# Patient Record
Sex: FEMALE | Race: WHITE | Employment: UNEMPLOYED | URBAN - METROPOLITAN AREA
[De-identification: names, ages, dates, MRNs, and addresses within clinical notes are randomized per-mention and may not be internally consistent; named-entity substitution may affect disease eponyms.]

---

## 2016-07-08 LAB
ANTIBODY SCREEN, EXTERNAL: NEGATIVE
CHLAMYDIA, EXTERNAL: NEGATIVE
HBSAG, EXTERNAL: NEGATIVE
HCT, EXTERNAL: 36.6
HGB, EXTERNAL: 12
HIV, EXTERNAL: NEGATIVE
N. GONORRHEA, EXTERNAL: NEGATIVE
RPR, EXTERNAL: NEGATIVE
RUBELLA, EXTERNAL: NORMAL
URINALYSIS, EXTERNAL: NEGATIVE

## 2017-01-04 LAB — GRBS, EXTERNAL: NEGATIVE

## 2017-02-07 ENCOUNTER — ANESTHESIA EVENT (OUTPATIENT)
Dept: LABOR AND DELIVERY | Age: 37
End: 2017-02-07
Payer: OTHER GOVERNMENT

## 2017-02-07 ENCOUNTER — ANESTHESIA (OUTPATIENT)
Dept: LABOR AND DELIVERY | Age: 37
End: 2017-02-07
Payer: OTHER GOVERNMENT

## 2017-02-07 ENCOUNTER — HOSPITAL ENCOUNTER (INPATIENT)
Age: 37
LOS: 1 days | Discharge: HOME | End: 2017-02-08
Attending: OBSTETRICS & GYNECOLOGY | Admitting: OBSTETRICS & GYNECOLOGY
Payer: OTHER GOVERNMENT

## 2017-02-07 PROBLEM — Z67.91 RH NEGATIVE STATE IN ANTEPARTUM PERIOD: Status: ACTIVE | Noted: 2017-02-07

## 2017-02-07 PROBLEM — O26.899 RH NEGATIVE STATE IN ANTEPARTUM PERIOD: Status: ACTIVE | Noted: 2017-02-07

## 2017-02-07 PROBLEM — O09.529 AMA (ADVANCED MATERNAL AGE) MULTIGRAVIDA 35+: Status: ACTIVE | Noted: 2017-02-07

## 2017-02-07 LAB
ABO + RH BLD: NORMAL
BASOPHILS # BLD AUTO: 0 K/UL (ref 0–0.06)
BASOPHILS # BLD: 0 % (ref 0–2)
BLOOD BANK CMNT PATIENT-IMP: NORMAL
BLOOD GROUP ANTIBODIES SERPL: NORMAL
BLOOD GROUP ANTIBODIES SERPL: NORMAL
DIFFERENTIAL METHOD BLD: ABNORMAL
EOSINOPHIL # BLD: 0 K/UL (ref 0–0.4)
EOSINOPHIL NFR BLD: 0 % (ref 0–5)
ERYTHROCYTE [DISTWIDTH] IN BLOOD BY AUTOMATED COUNT: 13.7 % (ref 11.6–14.5)
HCT VFR BLD AUTO: 38 % (ref 35–45)
HGB BLD-MCNC: 12.9 G/DL (ref 12–16)
LYMPHOCYTES # BLD AUTO: 8 % (ref 21–52)
LYMPHOCYTES # BLD: 1.2 K/UL (ref 0.9–3.6)
MCH RBC QN AUTO: 31 PG (ref 24–34)
MCHC RBC AUTO-ENTMCNC: 33.9 G/DL (ref 31–37)
MCV RBC AUTO: 91.3 FL (ref 74–97)
MONOCYTES # BLD: 0.6 K/UL (ref 0.05–1.2)
MONOCYTES NFR BLD AUTO: 4 % (ref 3–10)
NEUTS SEG # BLD: 12.6 K/UL (ref 1.8–8)
NEUTS SEG NFR BLD AUTO: 88 % (ref 40–73)
PLATELET # BLD AUTO: 136 K/UL (ref 135–420)
PMV BLD AUTO: 10.6 FL (ref 9.2–11.8)
RBC # BLD AUTO: 4.16 M/UL (ref 4.2–5.3)
SPECIMEN EXP DATE BLD: NORMAL
WBC # BLD AUTO: 14.4 K/UL (ref 4.6–13.2)

## 2017-02-07 PROCEDURE — 77010026065 HC OXYGEN MINIMUM MEDICAL AIR

## 2017-02-07 PROCEDURE — 00HU33Z INSERTION OF INFUSION DEVICE INTO SPINAL CANAL, PERCUTANEOUS APPROACH: ICD-10-PCS | Performed by: ANESTHESIOLOGY

## 2017-02-07 PROCEDURE — 75410000000 HC DELIVERY VAGINAL/SINGLE

## 2017-02-07 PROCEDURE — 75410000003 HC RECOV DEL/VAG/CSECN EA 0.5 HR

## 2017-02-07 PROCEDURE — 74011250637 HC RX REV CODE- 250/637: Performed by: ADVANCED PRACTICE MIDWIFE

## 2017-02-07 PROCEDURE — 74011250636 HC RX REV CODE- 250/636

## 2017-02-07 PROCEDURE — 74011250637 HC RX REV CODE- 250/637

## 2017-02-07 PROCEDURE — 75410000002 HC LABOR FEE PER 1 HR

## 2017-02-07 PROCEDURE — 65270000029 HC RM PRIVATE

## 2017-02-07 PROCEDURE — 74011000250 HC RX REV CODE- 250

## 2017-02-07 PROCEDURE — 76060000078 HC EPIDURAL ANESTHESIA

## 2017-02-07 PROCEDURE — 74011250636 HC RX REV CODE- 250/636: Performed by: ADVANCED PRACTICE MIDWIFE

## 2017-02-07 PROCEDURE — 77030020255 HC SOL INJ LR 1000ML BG

## 2017-02-07 PROCEDURE — 85025 COMPLETE CBC W/AUTO DIFF WBC: CPT | Performed by: ADVANCED PRACTICE MIDWIFE

## 2017-02-07 PROCEDURE — 86900 BLOOD TYPING SEROLOGIC ABO: CPT | Performed by: ADVANCED PRACTICE MIDWIFE

## 2017-02-07 PROCEDURE — 74011250636 HC RX REV CODE- 250/636: Performed by: ANESTHESIOLOGY

## 2017-02-07 PROCEDURE — 77030034849

## 2017-02-07 PROCEDURE — 86870 RBC ANTIBODY IDENTIFICATION: CPT | Performed by: ADVANCED PRACTICE MIDWIFE

## 2017-02-07 RX ORDER — PROMETHAZINE HYDROCHLORIDE 25 MG/ML
25 INJECTION, SOLUTION INTRAMUSCULAR; INTRAVENOUS
Status: DISCONTINUED | OUTPATIENT
Start: 2017-02-07 | End: 2017-02-08 | Stop reason: HOSPADM

## 2017-02-07 RX ORDER — CARBOPROST TROMETHAMINE 250 UG/ML
250 INJECTION, SOLUTION INTRAMUSCULAR
Status: DISCONTINUED | OUTPATIENT
Start: 2017-02-07 | End: 2017-02-07 | Stop reason: HOSPADM

## 2017-02-07 RX ORDER — OXYCODONE AND ACETAMINOPHEN 5; 325 MG/1; MG/1
1-2 TABLET ORAL
Status: DISCONTINUED | OUTPATIENT
Start: 2017-02-07 | End: 2017-02-08 | Stop reason: HOSPADM

## 2017-02-07 RX ORDER — TERBUTALINE SULFATE 1 MG/ML
0.25 INJECTION SUBCUTANEOUS
Status: DISCONTINUED | OUTPATIENT
Start: 2017-02-07 | End: 2017-02-07 | Stop reason: HOSPADM

## 2017-02-07 RX ORDER — AMOXICILLIN 250 MG
1 CAPSULE ORAL 2 TIMES DAILY
Status: DISCONTINUED | OUTPATIENT
Start: 2017-02-07 | End: 2017-02-08 | Stop reason: HOSPADM

## 2017-02-07 RX ORDER — MAG HYDROX/ALUMINUM HYD/SIMETH 200-200-20
15 SUSPENSION, ORAL (FINAL DOSE FORM) ORAL
Status: DISCONTINUED | OUTPATIENT
Start: 2017-02-07 | End: 2017-02-07 | Stop reason: HOSPADM

## 2017-02-07 RX ORDER — OXYTOCIN IN 5 % DEXTROSE 30/500 ML
.5-2 PLASTIC BAG, INJECTION (ML) INTRAVENOUS
Status: DISCONTINUED | OUTPATIENT
Start: 2017-02-07 | End: 2017-02-07

## 2017-02-07 RX ORDER — BUTORPHANOL TARTRATE 1 MG/ML
1-2 INJECTION INTRAMUSCULAR; INTRAVENOUS
Status: DISCONTINUED | OUTPATIENT
Start: 2017-02-07 | End: 2017-02-07 | Stop reason: HOSPADM

## 2017-02-07 RX ORDER — LIDOCAINE HYDROCHLORIDE AND EPINEPHRINE 15; 5 MG/ML; UG/ML
INJECTION, SOLUTION EPIDURAL AS NEEDED
Status: DISCONTINUED | OUTPATIENT
Start: 2017-02-07 | End: 2017-02-07 | Stop reason: HOSPADM

## 2017-02-07 RX ORDER — FENTANYL CITRATE 50 UG/ML
INJECTION, SOLUTION INTRAMUSCULAR; INTRAVENOUS
Status: COMPLETED
Start: 2017-02-07 | End: 2017-02-07

## 2017-02-07 RX ORDER — NALOXONE HYDROCHLORIDE 0.4 MG/ML
0.2 INJECTION, SOLUTION INTRAMUSCULAR; INTRAVENOUS; SUBCUTANEOUS AS NEEDED
Status: DISCONTINUED | OUTPATIENT
Start: 2017-02-07 | End: 2017-02-07 | Stop reason: HOSPADM

## 2017-02-07 RX ORDER — NALBUPHINE HYDROCHLORIDE 10 MG/ML
10 INJECTION, SOLUTION INTRAMUSCULAR; INTRAVENOUS; SUBCUTANEOUS
Status: DISCONTINUED | OUTPATIENT
Start: 2017-02-07 | End: 2017-02-07 | Stop reason: HOSPADM

## 2017-02-07 RX ORDER — HYDROMORPHONE HYDROCHLORIDE 1 MG/ML
1 INJECTION, SOLUTION INTRAMUSCULAR; INTRAVENOUS; SUBCUTANEOUS
Status: DISCONTINUED | OUTPATIENT
Start: 2017-02-07 | End: 2017-02-07 | Stop reason: HOSPADM

## 2017-02-07 RX ORDER — METHYLERGONOVINE MALEATE 0.2 MG/ML
0.2 INJECTION INTRAVENOUS AS NEEDED
Status: DISCONTINUED | OUTPATIENT
Start: 2017-02-07 | End: 2017-02-07 | Stop reason: HOSPADM

## 2017-02-07 RX ORDER — SODIUM CHLORIDE 0.9 % (FLUSH) 0.9 %
5-10 SYRINGE (ML) INJECTION EVERY 8 HOURS
Status: DISCONTINUED | OUTPATIENT
Start: 2017-02-07 | End: 2017-02-07 | Stop reason: HOSPADM

## 2017-02-07 RX ORDER — SODIUM CHLORIDE, SODIUM LACTATE, POTASSIUM CHLORIDE, CALCIUM CHLORIDE 600; 310; 30; 20 MG/100ML; MG/100ML; MG/100ML; MG/100ML
125 INJECTION, SOLUTION INTRAVENOUS CONTINUOUS
Status: DISCONTINUED | OUTPATIENT
Start: 2017-02-07 | End: 2017-02-07 | Stop reason: HOSPADM

## 2017-02-07 RX ORDER — ONDANSETRON 2 MG/ML
4 INJECTION INTRAMUSCULAR; INTRAVENOUS
Status: DISCONTINUED | OUTPATIENT
Start: 2017-02-07 | End: 2017-02-07 | Stop reason: HOSPADM

## 2017-02-07 RX ORDER — MISOPROSTOL 200 UG/1
800 TABLET ORAL
Status: DISCONTINUED | OUTPATIENT
Start: 2017-02-07 | End: 2017-02-07 | Stop reason: HOSPADM

## 2017-02-07 RX ORDER — CASTOR OIL 100 %
OIL (ML) ORAL
Status: COMPLETED
Start: 2017-02-07 | End: 2017-02-07

## 2017-02-07 RX ORDER — LIDOCAINE HYDROCHLORIDE 10 MG/ML
30 INJECTION, SOLUTION EPIDURAL; INFILTRATION; INTRACAUDAL; PERINEURAL AS NEEDED
Status: DISCONTINUED | OUTPATIENT
Start: 2017-02-07 | End: 2017-02-07 | Stop reason: HOSPADM

## 2017-02-07 RX ORDER — OXYTOCIN/RINGER'S LACTATE 20/1000 ML
500 PLASTIC BAG, INJECTION (ML) INTRAVENOUS ONCE
Status: COMPLETED | OUTPATIENT
Start: 2017-02-07 | End: 2017-02-07

## 2017-02-07 RX ORDER — FENTANYL CITRATE 50 UG/ML
100 INJECTION, SOLUTION INTRAMUSCULAR; INTRAVENOUS ONCE
Status: COMPLETED | OUTPATIENT
Start: 2017-02-07 | End: 2017-02-07

## 2017-02-07 RX ORDER — SODIUM CHLORIDE 0.9 % (FLUSH) 0.9 %
5-10 SYRINGE (ML) INJECTION AS NEEDED
Status: DISCONTINUED | OUTPATIENT
Start: 2017-02-07 | End: 2017-02-07 | Stop reason: HOSPADM

## 2017-02-07 RX ORDER — DIPHENHYDRAMINE HYDROCHLORIDE 50 MG/ML
25 INJECTION, SOLUTION INTRAMUSCULAR; INTRAVENOUS
Status: DISCONTINUED | OUTPATIENT
Start: 2017-02-07 | End: 2017-02-07 | Stop reason: HOSPADM

## 2017-02-07 RX ORDER — IBUPROFEN 400 MG/1
800 TABLET ORAL
Status: DISCONTINUED | OUTPATIENT
Start: 2017-02-07 | End: 2017-02-08 | Stop reason: HOSPADM

## 2017-02-07 RX ORDER — TRISODIUM CITRATE DIHYDRATE AND CITRIC ACID MONOHYDRATE 500; 334 MG/5ML; MG/5ML
30 SOLUTION ORAL AS NEEDED
Status: DISCONTINUED | OUTPATIENT
Start: 2017-02-07 | End: 2017-02-07 | Stop reason: HOSPADM

## 2017-02-07 RX ORDER — ACETAMINOPHEN 325 MG/1
650 TABLET ORAL
Status: DISCONTINUED | OUTPATIENT
Start: 2017-02-07 | End: 2017-02-08 | Stop reason: HOSPADM

## 2017-02-07 RX ORDER — ROPIVACAINE HYDROCHLORIDE 2 MG/ML
INJECTION, SOLUTION EPIDURAL; INFILTRATION; PERINEURAL AS NEEDED
Status: DISCONTINUED | OUTPATIENT
Start: 2017-02-07 | End: 2017-02-07 | Stop reason: HOSPADM

## 2017-02-07 RX ORDER — OXYTOCIN/RINGER'S LACTATE 20/1000 ML
125 PLASTIC BAG, INJECTION (ML) INTRAVENOUS CONTINUOUS
Status: DISCONTINUED | OUTPATIENT
Start: 2017-02-07 | End: 2017-02-07 | Stop reason: HOSPADM

## 2017-02-07 RX ADMIN — LIDOCAINE HYDROCHLORIDE AND EPINEPHRINE 3 ML: 15; 5 INJECTION, SOLUTION EPIDURAL at 09:31

## 2017-02-07 RX ADMIN — LIDOCAINE HYDROCHLORIDE AND EPINEPHRINE 2 ML: 15; 5 INJECTION, SOLUTION EPIDURAL at 09:32

## 2017-02-07 RX ADMIN — FENTANYL CITRATE 100 MCG: 50 INJECTION, SOLUTION INTRAMUSCULAR; INTRAVENOUS at 09:35

## 2017-02-07 RX ADMIN — Medication 10000 MILLI-UNITS/HR: at 13:20

## 2017-02-07 RX ADMIN — IBUPROFEN 800 MG: 400 TABLET, FILM COATED ORAL at 22:27

## 2017-02-07 RX ADMIN — Medication 10 ML/HR: at 09:36

## 2017-02-07 RX ADMIN — ROPIVACAINE HYDROCHLORIDE 5 ML: 2 INJECTION, SOLUTION EPIDURAL; INFILTRATION; PERINEURAL at 09:38

## 2017-02-07 RX ADMIN — SODIUM CHLORIDE, SODIUM LACTATE, POTASSIUM CHLORIDE, AND CALCIUM CHLORIDE 1000 ML: 600; 310; 30; 20 INJECTION, SOLUTION INTRAVENOUS at 10:00

## 2017-02-07 RX ADMIN — CASTOR OIL 59 ML: 100 LIQUID ORAL at 13:17

## 2017-02-07 RX ADMIN — Medication 2 MILLI-UNITS/MIN: at 12:14

## 2017-02-07 RX ADMIN — SODIUM CHLORIDE, SODIUM LACTATE, POTASSIUM CHLORIDE, AND CALCIUM CHLORIDE 125 ML/HR: 600; 310; 30; 20 INJECTION, SOLUTION INTRAVENOUS at 08:00

## 2017-02-07 NOTE — PROGRESS NOTES
Adelaide Worthington began pushing spontaneously. Large lip present, but soft and easy to raise.  Adelaide Worthington made several attempts to push under the lip, but it returns full-on and she is struggling to move this baby from 0 station as her desire was to have epidural. Will contact anesthesia to administer, allow labor down and anticipate vaginal birth

## 2017-02-07 NOTE — ANESTHESIA PREPROCEDURE EVALUATION
Anesthetic History   No history of anesthetic complications            Review of Systems / Medical History  Patient summary reviewed, nursing notes reviewed and pertinent labs reviewed    Pulmonary  Within defined limits                 Neuro/Psych   Within defined limits           Cardiovascular  Within defined limits                Exercise tolerance: >4 METS     GI/Hepatic/Renal  Within defined limits              Endo/Other  Within defined limits           Other Findings              Physical Exam    Airway  Mallampati: II  TM Distance: 4 - 6 cm    Mouth opening: Normal     Cardiovascular  Regular rate and rhythm,  S1 and S2 normal,  no murmur, click, rub, or gallop  Rhythm: regular  Rate: normal         Dental  No notable dental hx       Pulmonary  Breath sounds clear to auscultation               Abdominal  GI exam deferred       Other Findings   Comments: Current Smoker? NO       Elective Surgery? Yes       Abstained from smoking 24 hours prior to anesthesia? N/A    Risk Factors for Postoperative nausea/vomiting:       History of postoperative nausea/vomiting? NO       Female? YES       Motion sickness? NO       Intended opioid administration for postoperative analgesia?   NO         Anesthetic Plan    ASA: 2  Anesthesia type: epidural            Anesthetic plan and risks discussed with: Patient

## 2017-02-07 NOTE — PROGRESS NOTES
Pt arrived ambulatory from home complaining of leaking fluid since 0615 and some blood. Pt states she is michelle about 5 minutes apart with a pain level of 6. Pt denies epigastric pain, visual disturbances. No edema present. Pt placed on fetal monitor.

## 2017-02-07 NOTE — IP AVS SNAPSHOT
Current Discharge Medication List  
  
Take these medications at their scheduled times Dose & Instructions Dispensing Information Comments Morning Noon Evening Bedtime  
 iron polysaccharides 150 mg iron capsule Commonly known as:  Miriam Sprloanle Your next dose is: Today, Tomorrow Other:  ____________ Dose:  150 mg Take 1 Cap by mouth daily. Quantity:  30 Cap Refills:  2 PNV with Ca No.65-Iron Poly-FA 60 mg iron-1 mg Cap Your next dose is: Today, Tomorrow Other:  ____________ Dose:  1 Tab Take 1 Tab by mouth daily. Indications: PREGNANCY Refills:  0 Take these medications as needed Dose & Instructions Dispensing Information Comments Morning Noon Evening Bedtime  
 ibuprofen 800 mg tablet Commonly known as:  MOTRIN Your next dose is: Today, Tomorrow Other:  ____________ Dose:  800 mg Take 1 Tab by mouth every eight (8) hours as needed for Pain. Quantity:  60 Tab Refills:  0 Take these medications as directed Dose & Instructions Dispensing Information Comments Morning Noon Evening Bedtime ALFALFA (BULK) Your next dose is: Today, Tomorrow Other:  ____________  
   
   
 by Does Not Apply route. Refills:  0 Where to Get Your Medications Information about where to get these medications is not yet available ! Ask your nurse or doctor about these medications  
  ibuprofen 800 mg tablet

## 2017-02-07 NOTE — PROGRESS NOTES
Labor Progress Note  Patient seen, fetal heart rate and contraction pattern evaluated, patient examined. Very comfortable with epidural  Patient Vitals for the past 1 hrs:   Temp   02/07/17 1044 97.9 °F (36.6 °C)       Physical Exam:  Cervical Exam: persistent AL and fetal head a 0 station; decel with manual reduction of lip  Membranes:  leaking clear, non-foul fluid  Uterine Activity: Frequency: Every 2 minutes and Duration: 60-70 seconds  Fetal Heart Rate: Baseline: 125 per minute  Variability: moderate  Accelerations: yes  Decelerations: variable    Assessment:  IUP 41.0 wks GA; labor plateau; FHT Cat 2  Plan: RN to place FSE and start Pitocin.  Will evaluate progress within an hour

## 2017-02-07 NOTE — PROGRESS NOTES
[id number] at bedside to assess. Pushing . Cervix swollen instructed not to push, anesthesia paged. [id number] at bedside discussing epidural placement.

## 2017-02-07 NOTE — H&P
History & Physical    Name: Dima Garcia MRN: [id number]  SSN: [id number]    YOB: 1980  Age: 43 y.o. Sex: female        Subjective:     Estimated Date of Delivery: None noted. OB History      Para Term  AB TAB SAB Ectopic Multiple Living    2 1 1      0 1          Ms. Pavithra Sharif is admitted with pregnancy at Unknown for active labor. Contractions began at o630 this morning with a moderate movement of fluid \" not a gush but not a trickle\". Prenatal course was normal. Prenatal care has been followed by [facility] from 10 wks. Dating confirmed at that visit. AMA with Neg NIPT. Speedy Brooke had an elevated AFP Mom of 2.8 with negative TORCH titers and reassuring growth scans. At 36 wks her EFW was in the 42nd percentile. Followed for low platelets. Platelets 413 at 15FCR. CBC this morning is pending. Rosita received RhoGam at 28 wks. .  Please see prenatal records for details. No fluid seen on admission but michelle q 3 min. Nitrazine and Amnisure negative. Offered AROM and Speedy Brooke agrees. Moderate amount of clear fluid. Past Medical History   Diagnosis Date    Chlamydia      history of in past     Past Surgical History   Procedure Laterality Date    Hx other surgical       2004 foot surgery     Social History     Occupational History    Not on file. Social History Main Topics    Smoking status: Never Smoker    Smokeless tobacco: Never Used    Alcohol use No    Drug use: Not on file    Sexual activity: Yes     Partners: Male     No family history on file. Allergies   Allergen Reactions    Sulfa (Sulfonamide Antibiotics) Hives     Prior to Admission medications    Medication Sig Start Date End Date Taking? Authorizing Provider   ALFALFA, BULK, by Does Not Apply route. Yes Historical Provider   PNV with Ca No.65-Iron Poly-FA 60 mg iron-1 mg cap Take 1 Tab by mouth daily.  Indications: PREGNANCY   Yes Historical Provider   ibuprofen (MOTRIN) 800 mg tablet Take 1 Tab by mouth every eight (8) hours as needed for Pain. 9/10/15   Tom Paz CNM   iron polysaccharides (NIFEREX) 150 mg iron capsule Take 1 Cap by mouth daily. 9/10/15   Devi Jung CNM        Review of Systems: Pertinent items are noted in HPI. Objective:     Vitals:  Vitals:    02/07/17 0721   Weight: 70.3 kg (155 lb)   Height: 5' 5\" (1.651 m)        Physical Exam:  Patient without distress. Abdomen: soft, nontender  Fundus: soft and non tender  Perineum: blood absent, amniotic fluid absent per nitrazine and Amnisure. Cervical Exam: 6 cm dilated    90% effaced    0 station    Presenting Part: cephalic  Cervical Position: anterior  Consistency: Soft  Lower Extremities:  - Edema No  Membranes:  Artificial Rupture of Membranes; Amniotic Fluid: medium amount of clear fluid  Fetal Heart Rate: Reactive  Baseline: 135 per minute  Variability: moderate  Accelerations: yes  Decelerations: none  Uterine contractions: regular, every 3 minutes    Prenatal Labs:   Lab Results   Component Value Date/Time    Rubella, External Immune 02/09/2015    GrBStrep, External neg 08/05/2015    HBsAg, External neg 02/09/2015    HIV, External neg 02/09/2015    RPR, External neg 02/09/2015    Gonorrhea, External neg 02/09/2015    Chlamydia, External neg 02/09/2015       Group B Strep was negative. Assessment/Plan:   Assessment: IUP at 41 wks. Category 1 strip   GBS neg  AMA with normal NIPT  Rh Neg  Low platelets  Abnormal AFP Mom with normal growth scans  Plan: Admit for labor and birth. Dr Neftali Sumner notified of the admission. Dr Neftali Sumner in to see pt.      Signed By:  Isa Kim CNM     February 7, 2017

## 2017-02-07 NOTE — PROGRESS NOTES
1315: L&D RN, Erica Wilkerson CNM, and Nursery RN in room for delivery    1318: Pt began pushing    1319:  of VFI. Nursery at bedside to assume care in infant. Baby crying with tactile suction and bulb suction. 1325: Placenta delivered.

## 2017-02-07 NOTE — IP AVS SNAPSHOT
Cornelia Seaman 
 
 
 [address] [place] [address] Patient: Silvano Feliz MRN: [id number] KRE:[id number] You are allergic to the following Allergen Reactions Sulfa (Sulfonamide Antibiotics) Hives Immunizations Administered for This Admission Name Date Rho(D) Immune Globulin - IM 2/8/2017 Recent Documentation Height Weight Breastfeeding? BMI OB Status Smoking Status 1.651 m 70.3 kg Unknown 25.79 kg/m2 Recent pregnancy Never Smoker Unresulted Labs Order Current Status RH IMMUNE GLOBULIN EVALUATION Preliminary result Emergency Contacts Name Discharge Info Relation Home Work Mobile Heide Alonso  Mother [14] [phone] About your hospitalization You were admitted on:  February 7, 2017 You last received care in the:  [place] [address] You were discharged on:  February 8, 2017 Unit phone number:  [phone] Why you were hospitalized Your primary diagnosis was:  Postpartum Care Following Vaginal Delivery Your diagnoses also included:  Labor And Delivery Indication For Care Or Intervention, [place] (Advanced Maternal Age) Multigravida 35+, Rh Negative State In Antepartum Period, Labor And Delivery Indication For Care Or Intervention Providers Seen During Your Hospitalizations Provider Role Specialty Primary office phone Mackenzie Bonds MD Attending Provider Obstetrics & Gynecology [phone] Your Primary Care Physician (PCP) Primary Care Physician Office Phone Office Fax OTHER, PHYS ** None ** ** None ** Follow-up Information Follow up With Details Comments Contact Info Rafia Pepper MD   Patient can only remember the practice name and not the physician Not On File [facility]   Not On File (62) Patient has a PCP but that physician is not listed in [facility]. Joaquin Garzon CNM Schedule an appointment as soon as possible for a visit in 6 weeks postpartum check [id number] [facility] [address] [place] [id number] [phone] Current Discharge Medication List  
  
CONTINUE these medications which have NOT CHANGED Dose & Instructions Dispensing Information Comments Morning Noon Evening Bedtime ALFALFA (BULK) Your next dose is: Today, Tomorrow Other:  _________  
   
   
 by Does Not Apply route. Refills:  0  
     
   
   
   
  
 ibuprofen 800 mg tablet Commonly known as:  MOTRIN Your next dose is: Today, Tomorrow Other:  _________ Dose:  800 mg Take 1 Tab by mouth every eight (8) hours as needed for Pain. Quantity:  60 Tab Refills:  0  
     
   
   
   
  
 iron polysaccharides 150 mg iron capsule Commonly known as:  Amelia Sims Your next dose is: Today, Tomorrow Other:  _________ Dose:  150 mg Take 1 Cap by mouth daily. Quantity:  30 Cap Refills:  2 PNV with Ca No.65-Iron Poly-FA 60 mg iron-1 mg Cap Your next dose is: Today, Tomorrow Other:  _________ Dose:  1 Tab Take 1 Tab by mouth daily. Indications: PREGNANCY Refills:  0 Where to Get Your Medications Information on where to get these meds will be given to you by the nurse or doctor. ! Ask your nurse or doctor about these medications  
  ibuprofen 800 mg tablet Discharge Instructions CONGRATULATIONS ON THE BIRTH OF YOUR BABY! The first six weeks after childbirth is a time of physical and emotional adjustment. This handout will help to answer questions and provide guidance during the postpartum period. Every family's adjustment is unique, so please call if you have further concerns.   At anytime we can be reached at [phone]. During office hours please ask to speak to a charge nurse. After hours, the answering service will take a message and the Nurse-Midwife on-call will return your call. If your question can wait until office hours: Monday-Friday 8:30-4:00, please do so. For emergencies or urgent concerns do not hesitate to call us after hours. DIET Your body is in need of a well-balanced, high protein diet to recuperate from birth. Please continue to take your prenatal vitamins for 6 weeks or as long as you are breastfeeding. Continue to drink at least 6-8 cups of water or other liquid a day. A breastfeeding mother also needs extra protein, calories and calcium containing foods. It is a good rule to drink fluids with every feeding in order to maintain an adequate milk supply and avoid dehydration. Your baby will probably not be bothered by things in your diet, but if the baby seems extremely fussy or develops a rash, you may want to discuss possible food intolerances with your baby's care provider. PAIN MEDICATIONS Acetaminophen (Tylenol), ibuprofen (Motrin), or other prescribed pain medication may be taken as directed to relieve discomfort. The above medications pass in very minimal amounts into the breast milk and usually will not cause problems. There are medications that may affect the baby, so please consult your baby's care provider before taking medication. If you are breastfeeding, be sure to mention this to any care provider you see so that medications that are safe may be selected. There is an excellent resource called [employer]ENN[employer]ER that is a resource for medication safety in pregnancy and lactation. You can visit their website at Plumbr. org/ or call them toll free at [phone] if you have any questions about medication safety. UTERINE INVOLUTION / VAGINAL BLEEDING Involution is the process of the uterus returning to pre-pregnant size. It will take approximately six weeks for this process to occur. To achieve this size your uterus becomes firm to slow bleeding loss from the placental site. The first 7 days after birth, the bleeding is red and heavy. It may change with your activity and position. Some small clots are normal.   After ten days, the bleeding should be pale pink and slowed considerably. The next several weeks may progress to a pink, mucousy discharge. This may continue for 6-8 weeks, depending on your activity. During the first four weeks after delivery we recommend using sanitary pads instead of tampons. Douching should also be avoided, but it is fine to take a tub bath so long as the tub is very clean. ACTIVITY/EXERCISE Adequate rest is essential to recovery. Try to rest or sleep when the baby sleeps. After two weeks, you may begin going for short walks, doing Kegel exercises and abdominal crunches. Avoid heavy, jarring or aerobic exercises. Remember to start out slowly and build up to your previous fitness level. Use common sense and don't overdo as rest is important and the benefits of increased rest are a quicker recovery. For the first two weeks after a  try to limit trips up or down steps. Do not lift anything heavier than the baby during this time. Lifting the baby or other objects should be done by bending at the knees rather than the waist.  Driving should be avoided during the first two to three weeks until you have the strength to push firmly on the brakes in case of an emergency. You may ride as a passenger, but DO wear a seat belt at all times. After a few weeks, you may resume normal activity at whatever pace is comfortable for you. Exercise may also be resumed gradually. Walking is a good way to start. Finally, try to be reasonable in your expectations. Caring for a new baby after major surgery can be quite trying.   Arrange for assistance at home to ensure that you get enough rest.  
 
POSTPARTUM CHECK You may call the office when you return home to set up a postpartum visit. Most patients will be seen at 6 weeks after delivery, but after a  or other circumstances you may be seen in 2 weeks or less. If you are discharged from the hospital with staples that must be removed, you will be asked to come in sooner. At your postpartum visit, a pelvic exam may be performed. If you are having any problems or concerns, please do not hesitate to call. Once again our number is [phone]. MOOD CHANGES Significant hormonal changes occur in the days following delivery, and as a result, many women experience brief episodes of tearfulness or feeling \"blue. \"  These emotional swings may be made worse by lack of sleep and by the adjustments inherent in becoming a mother. For some women, these fluctuations are minor. For others, they are overwhelming; creating feelings of anxiety, depression, or the inability to cope. If you have difficulty functioning as a result of feeling down, or if the mood changes seem severe, do not improve, or result is thoughts of harming yourself or others CALL RIGHT AWAY. PERINEAL CARE The basic goals of perineal care are to prevent infection, to relieve pain and promote healing. Your stitches will dissolve in four to six weeks, and do not need to be removed. After urinating, please continue to clean with warm water from front to back. Please continue sitz baths as instructed twice a day for a week or as needed. Call the office if you see pus in the suture site, or have unusual or severe swelling or pain that seems to be getting worse. INCISION CARE If you had a , clean and dry the incision gently as you would the rest of your body. Washing over the area with soap and water, and showering are fine.   If steri-strips are present they will gradually come off with time. Tub baths are permitted. You may experience numbness and burning in the area surrounding the incision which usually resolves gradually over the next several weeks or months. RETURN OF MENSTRUATION Your first menstrual period may occur as soon as four to six weeks after your delivery if you are not breast-feeding. If breast-feeding it is more difficult to predict when your first period will occur. Even if you are not yet menstruating, you may be ovulating and it may be possible to conceive again. It is common for your first period after childbirth to be very heavy with an increased amount of cramping. BREASTS Breast-feeding Mothers: Colostrum is excreted in the first 24-72 hours. Mature breast milk will appear on the 2nd to 5th day. Engorgement may occur with the mature milk making your breasts feel warm and very full. Frequent feedings will make you more comfortable. Babies do not nurse on regular schedules. Nursing every 1 1/2 to 2 hours is normal and frequent feeding DOES NOT mean you are not making enough milk. To avoid nipple confusion, do not give bottles for the first 4 weeks. Growth spurts are common and may require more frequent feedings. This is the way baby increases your milk supply. During a growth spurt, you may feel you are feeding very frequently and that your breasts are \"empty. \"  Don't worry, your milk is produced by supply and demand so this increased frequency of feeding will increase your milk supply within 48 hours. Sore nipples may occur with frequent feedings and are sometimes also caused by improper latch. Check for a proper latch. Baby should have a wide open mouth. Use different positions at each feeding if possible. Express a small amount of colostrum or breast milk onto the sore area and leave bra flaps unlatched until dry. The lactation consultant at [facility] is available for outpatient consultation without charge.   Call [phone] from Monday-Friday 9:00am- 3:00pm to arrange an outpatient appointment with her. Local [facility] [place]note 2[place] Group and consultants may also be very helpful. If You Are Not Breast-feeding: You will experience swelling, engorgement and some milk production. There are no safe medications available to stop lactation. Some remedies for engorgement include: wearing a tight bra, ice packs and cold green cabbage leaves placed between the breast and your bra. Change these frequently. Tylenol or Motrin should help with the discomfort. SEXUAL ADJUSTMENTS We recommend that you wait at least four weeks before resuming sexual intercourse. A sore perineum, a demanding baby and fatigue will certainly affect your ability to enjoy lovemaking! A vaginal lubricant is recommended to help with any dryness. It is very important to remember that you will ovulate BEFORE your first period and can conceive. If you do not wish another pregnancy right away, please take precautions to avoid pregnancy. If you would like a prescription method of birth control, please discuss this with us at your 6 week visit. ELIMINATION We remind all postpartum patients that it may take a few days for your bowels to return to normal, especially if you had a long labor. For those who had C-sections or severe lacerations, we recommend that you use a stool softener twice daily for at least two weeks. Many stool softeners are over-the-counter. Colace (Docusate Sodium) is recommended. Bulk forming agents such as Metamucil or Fibercon may be used daily in addition to a stool softener to promote regular bowel movements. Eating fresh fruits and vegetables along with whole grains is helpful as well. Do not be afraid to have a bowel movement as your stitches will not \"come out\" in the course of having a bowel movement.   Urination may be difficult due to soreness around the urethra, or as an after effect of epidural.  This is temporary and can be helped  by squirting water over the perineum or try going in the shower. Hemorrhoids are common after birth. Tucks pads, Anusol cream and avoiding constipation are helpful. If constipation does occur, you may take Milk of Magnesia or Senekot according to the package instructions. DANGER SIGNS! CALL WITHOUT DELAY IF YOU ARE EXPERIENCING ANY OF THE FOLLOWING: 
* Unusually heavy bleeding, soaking more than 1 or more pads in an hour. * Vaginal discharge with strong foul odor. * Fever of 101 or higher * Unusual pain or tenderness in the abdominal area. * If breasts are red, hot or have a painful lump. * Depression that persists longer than 1-2 weeks or is severe. * Any urinary frequency accompanied by urgency or pain. * A lump in leg or calf especially if painful, warm or red. We thank you for choosing us for your prenatal care and/or delivery. We wish you all happiness and health with your baby for his or her lifetime! Donnie Fatima CNM Discharge Orders None [employer] Announcement We are excited to announce that we are making your provider's discharge notes available to you in [facility] [employer]. You will see these notes when they are completed and signed by the physician that discharged you from your recent hospital stay. If you have any questions or concerns about any information you see in [facility] [employer], please call the Health Information Department where you were seen or reach out to your Primary Care Provider for more information about your plan of care. Introducing [address]! [employer] [place] [employer] [facility] introduces [facility] [employer] patient portal. Now you can access parts of your medical record, email your doctor's office, and request medication refills online. 1. In your internet browser, go to [email or web address] 2. Click on the First Time User? Click Here link in the Sign In box. You will see the New Member Sign Up page. 3. Enter your [employer] Access Code exactly as it appears below. You will not need to use this code after youve completed the sign-up process. If you do not sign up before the expiration date, you must request a new code. · [employer] Access Code: [email or web address] Expires: 4/18/2017 11:56 AM 
 
4. Enter the last four digits of your Social Security Number (xxxx) and Date of Birth (mm/dd/yyyy) as indicated and click Submit. You will be taken to the next sign-up page. 5. Create a [employer] ID. This will be your [employer] login ID and cannot be changed, so think of one that is secure and easy to remember. 6. Create a [employer] password. You can change your password at any time. 7. Enter your Password Reset Question and Answer. This can be used at a later time if you forget your password. 8. Enter your e-mail address. You will receive e-mail notification when new information is available in [address]. 9. Click Sign Up. You can now view and download portions of your medical record. 10. Click the Download Summary menu link to download a portable copy of your medical information. If you have questions, please visit the Frequently Asked Questions section of the [employer] website. Remember, [employer] is NOT to be used for urgent needs. For medical emergencies, dial 911. Now available from your iPhone and Android! General Information Please provide this summary of care documentation to your next provider. Patient Signature:  ____________________________________________________________ Date:  ____________________________________________________________  
  
Wally Laws Provider Signature:  ____________________________________________________________ Date:  ____________________________________________________________

## 2017-02-07 NOTE — IP AVS SNAPSHOT
Summary of Care Report The Summary of Care report has been created to help improve care coordination. Users with access to [employer] or [address] (Web-based application) may access additional patient information including the Discharge Summary. If you are not currently a [address] user and need more information, please call the number listed below in the [place] [address] section and ask to be connected with Medical Records. Facility Information Name Address Phone [address] [facility] [address] [facility] 83 [phone] Patient Information Patient Name Sex  Kerry Bah ([id number]) Female 1980 Discharge Information Admitting Provider Service Area Unit Sho Rankin MD / Johan Dozier 92 3 Mother Baby Unit / [phone] Discharge Provider Discharge Date/Time Discharge Disposition Destination (none) 2017 (Pending) AHR (none) Patient Language Language ENGLISH [13] Problem List as of 2017  Date Reviewed: 2017 Codes Priority Class Noted - Resolved Pregnancy ICD-10-CM: Z33.1 ICD-9-CM: V22.2   2015 - Present RESOLVED: Labor and delivery indication for care or intervention ICD-10-CM: O75.9 ICD-9-CM: 659.90   2017 - 2017 AMA (advanced maternal age) multigravida 35+ ICD-10-CM: O12.46 ICD-9-CM: 659.60   2017 - Present Rh negative state in antepartum period ICD-10-CM: O09.899 ICD-9-CM: V23.89   2017 - Present * (Principal)Postpartum care following vaginal delivery ICD-10-CM: Z39.2 ICD-9-CM: V24.2   2017 - Present Labor and delivery indication for care or intervention ICD-10-CM: O75.9 ICD-9-CM: 659.90   2017 - Present You are allergic to the following Allergen Reactions Sulfa (Sulfonamide Antibiotics) Hives Current Discharge Medication List  
  
CONTINUE these medications which have NOT CHANGED Dose & Instructions Dispensing Information Comments ALFALFA (BULK)  
 by Does Not Apply route. Refills:  0  
   
 ibuprofen 800 mg tablet Commonly known as:  MOTRIN Dose:  800 mg Take 1 Tab by mouth every eight (8) hours as needed for Pain. Quantity:  60 Tab Refills:  0  
   
 iron polysaccharides 150 mg iron capsule Commonly known as:  Bobie Spare Dose:  150 mg Take 1 Cap by mouth daily. Quantity:  30 Cap Refills:  2 PNV with Ca No.65-Iron Poly-FA 60 mg iron-1 mg Cap Dose:  1 Tab Take 1 Tab by mouth daily. Indications: PREGNANCY Refills:  0 Current Immunizations Name Date DTaP 7/9/2015 Influenza Vaccine 10/14/2016 Rho(D) Immune Globulin - IM 2/8/2017, 11/11/2016 Tdap 11/28/2016 Surgery Information ID Date/Time Status Primary Surgeon All Procedures Location [id number] 2/7/2017 [facility] - DO NOT SCHEDULE Follow-up Information Follow up With Details Comments Contact Info Phys Other, MD   Patient can only remember the practice name and not the physician Not On File Bshsi   Not On File (62) Patient has a PCP but that physician is not listed in [facility]. Luna Riojas CNM Schedule an appointment as soon as possible for a visit in 6 weeks postpartum check [facility] [address] [place] [address] [phone] 
[phone] Discharge Instructions CONGRATULATIONS ON THE BIRTH OF YOUR BABY! The first six weeks after childbirth is a time of physical and emotional adjustment. This handout will help to answer questions and provide guidance during the postpartum period. Every family's adjustment is unique, so please call if you have further concerns. At anytime we can be reached at [phone].   During office hours please ask to speak to a charge nurse. After hours, the answering service will take a message and the Nurse-Midwife on-call will return your call. If your question can wait until office hours: Monday-Friday 8:30-4:00, please do so. For emergencies or urgent concerns do not hesitate to call us after hours. DIET Your body is in need of a well-balanced, high protein diet to recuperate from birth. Please continue to take your prenatal vitamins for 6 weeks or as long as you are breastfeeding. Continue to drink at least 6-8 cups of water or other liquid a day. A breastfeeding mother also needs extra protein, calories and calcium containing foods. It is a good rule to drink fluids with every feeding in order to maintain an adequate milk supply and avoid dehydration. Your baby will probably not be bothered by things in your diet, but if the baby seems extremely fussy or develops a rash, you may want to discuss possible food intolerances with your baby's care provider. PAIN MEDICATIONS Acetaminophen (Tylenol), ibuprofen (Motrin), or other prescribed pain medication may be taken as directed to relieve discomfort. The above medications pass in very minimal amounts into the breast milk and usually will not cause problems. There are medications that may affect the baby, so please consult your baby's care provider before taking medication. If you are breastfeeding, be sure to mention this to any care provider you see so that medications that are safe may be selected. There is an excellent resource called [employer]ENNIFER that is a resource for medication safety in pregnancy and lactation. You can visit their website at [employer]/ or call them toll free at [phone] if you have any questions about medication safety. UTERINE INVOLUTION / VAGINAL BLEEDING Involution is the process of the uterus returning to pre-pregnant size. It will take approximately six weeks for this process to occur.  To achieve this size your uterus becomes firm to slow bleeding loss from the placental site. The first 7 days after birth, the bleeding is red and heavy. It may change with your activity and position. Some small clots are normal.   After ten days, the bleeding should be pale pink and slowed considerably. The next several weeks may progress to a pink, mucousy discharge. This may continue for 6-8 weeks, depending on your activity. During the first four weeks after delivery we recommend using sanitary pads instead of tampons. Douching should also be avoided, but it is fine to take a tub bath so long as the tub is very clean. ACTIVITY/EXERCISE Adequate rest is essential to recovery. Try to rest or sleep when the baby sleeps. After two weeks, you may begin going for short walks, doing Kegel exercises and abdominal crunches. Avoid heavy, jarring or aerobic exercises. Remember to start out slowly and build up to your previous fitness level. Use common sense and don't overdo as rest is important and the benefits of increased rest are a quicker recovery. For the first two weeks after a  try to limit trips up or down steps. Do not lift anything heavier than the baby during this time. Lifting the baby or other objects should be done by bending at the knees rather than the waist.  Driving should be avoided during the first two to three weeks until you have the strength to push firmly on the brakes in case of an emergency. You may ride as a passenger, but DO wear a seat belt at all times. After a few weeks, you may resume normal activity at whatever pace is comfortable for you. Exercise may also be resumed gradually. Walking is a good way to start. Finally, try to be reasonable in your expectations. Caring for a new baby after major surgery can be quite trying.   Arrange for assistance at home to ensure that you get enough rest.  
 
POSTPARTUM CHECK 
 You may call the office when you return home to set up a postpartum visit. Most patients will be seen at 6 weeks after delivery, but after a  or other circumstances you may be seen in 2 weeks or less. If you are discharged from the hospital with staples that must be removed, you will be asked to come in sooner. At your postpartum visit, a pelvic exam may be performed. If you are having any problems or concerns, please do not hesitate to call. Once again our number is [phone]. MOOD CHANGES Significant hormonal changes occur in the days following delivery, and as a result, many women experience brief episodes of tearfulness or feeling \"blue. \"  These emotional swings may be made worse by lack of sleep and by the adjustments inherent in becoming a mother. For some women, these fluctuations are minor. For others, they are overwhelming; creating feelings of anxiety, depression, or the inability to cope. If you have difficulty functioning as a result of feeling down, or if the mood changes seem severe, do not improve, or result is thoughts of harming yourself or others CALL RIGHT AWAY. PERINEAL CARE The basic goals of perineal care are to prevent infection, to relieve pain and promote healing. Your stitches will dissolve in four to six weeks, and do not need to be removed. After urinating, please continue to clean with warm water from front to back. Please continue sitz baths as instructed twice a day for a week or as needed. Call the office if you see pus in the suture site, or have unusual or severe swelling or pain that seems to be getting worse. INCISION CARE If you had a , clean and dry the incision gently as you would the rest of your body. Washing over the area with soap and water, and showering are fine. If steri-strips are present they will gradually come off with time. Tub baths are permitted.   You may experience numbness and burning in the area surrounding the incision which usually resolves gradually over the next several weeks or months. RETURN OF MENSTRUATION Your first menstrual period may occur as soon as four to six weeks after your delivery if you are not breast-feeding. If breast-feeding it is more difficult to predict when your first period will occur. Even if you are not yet menstruating, you may be ovulating and it may be possible to conceive again. It is common for your first period after childbirth to be very heavy with an increased amount of cramping. BREASTS Breast-feeding Mothers: Colostrum is excreted in the first 24-72 hours. Mature breast milk will appear on the 2nd to 5th day. Engorgement may occur with the mature milk making your breasts feel warm and very full. Frequent feedings will make you more comfortable. Babies do not nurse on regular schedules. Nursing every 1 1/2 to 2 hours is normal and frequent feeding DOES NOT mean you are not making enough milk. To avoid nipple confusion, do not give bottles for the first 4 weeks. Growth spurts are common and may require more frequent feedings. This is the way baby increases your milk supply. During a growth spurt, you may feel you are feeding very frequently and that your breasts are \"empty. \"  Don't worry, your milk is produced by supply and demand so this increased frequency of feeding will increase your milk supply within 48 hours. Sore nipples may occur with frequent feedings and are sometimes also caused by improper latch. Check for a proper latch. Baby should have a wide open mouth. Use different positions at each feeding if possible. Express a small amount of colostrum or breast milk onto the sore area and leave bra flaps unlatched until dry. The lactation consultant at [facility] is available for outpatient consultation without charge.   Call [phone] from Monday-Friday 9:00am- 3:00pm to arrange an outpatient appointment with her.  [employer] meetings and consultants may also be very helpful. If You Are Not Breast-feeding: You will experience swelling, engorgement and some milk production. There are no safe medications available to stop lactation. Some remedies for engorgement include: wearing a tight bra, ice packs and cold green cabbage leaves placed between the breast and your bra. Change these frequently. Tylenol or Motrin should help with the discomfort. SEXUAL ADJUSTMENTS We recommend that you wait at least four weeks before resuming sexual intercourse. A sore perineum, a demanding baby and fatigue will certainly affect your ability to enjoy lovemaking! A vaginal lubricant is recommended to help with any dryness. It is very important to remember that you will ovulate BEFORE your first period and can conceive. If you do not wish another pregnancy right away, please take precautions to avoid pregnancy. If you would like a prescription method of birth control, please discuss this with us at your 6 week visit. ELIMINATION We remind all postpartum patients that it may take a few days for your bowels to return to normal, especially if you had a long labor. For those who had C-sections or severe lacerations, we recommend that you use a stool softener twice daily for at least two weeks. Many stool softeners are over-the-counter. Colace (Docusate Sodium) is recommended. Bulk forming agents such as Metamucil or Fibercon may be used daily in addition to a stool softener to promote regular bowel movements. Eating fresh fruits and vegetables along with whole grains is helpful as well. Do not be afraid to have a bowel movement as your stitches will not \"come out\" in the course of having a bowel movement.   Urination may be difficult due to soreness around the urethra, or as an after effect of epidural.  This is temporary and can be helped  by squirting water over the perineum or try going in the shower. Hemorrhoids are common after birth. Tucks pads, Anusol cream and avoiding constipation are helpful. If constipation does occur, you may take Milk of Magnesia or Senekot according to the package instructions. DANGER SIGNS! CALL WITHOUT DELAY IF YOU ARE EXPERIENCING ANY OF THE FOLLOWING: 
* Unusually heavy bleeding, soaking more than 1 or more pads in an hour. * Vaginal discharge with strong foul odor. * Fever of 101 or higher * Unusual pain or tenderness in the abdominal area. * If breasts are red, hot or have a painful lump. * Depression that persists longer than 1-2 weeks or is severe. * Any urinary frequency accompanied by urgency or pain. * A lump in leg or calf especially if painful, warm or red. We thank you for choosing us for your prenatal care and/or delivery. We wish you all happiness and health with your baby for his or her lifetime! Edith Roman CNM Chart Review Routing History No Routing History on File

## 2017-02-07 NOTE — ANESTHESIA PROCEDURE NOTES
Epidural Block    Start time: 2/7/2017 9:15 AM  End time: 2/7/2017 9:45 AM  Performed by: Ernie Cat  Authorized by: Ernie Cat     Pre-Procedure  Indication: at surgeon's request and primary anesthetic    Preanesthetic Checklist: patient identified, risks and benefits discussed, anesthesia consent, site marked, patient being monitored, timeout performed and anesthesia consent    Timeout Time: 09:20        Epidural:   Patient position:  Seated  Prep region:  Lumbar  Prep: Betadine and Patient draped    Location:  L2-3    Needle and Epidural Catheter:   Needle Type:  Tuohy  Needle Gauge:  18 G  Injection Technique:  Loss of resistance using saline  Attempts:  2  Catheter Size:  20 G  Catheter at Skin Depth (cm):  10  Depth in Epidural Space (cm):  5  Events: no blood with aspiration, no cerebrospinal fluid with aspiration and no paresthesia    Test Dose:  Negative and lidocaine 1.5% w/ epi    Assessment:   Catheter Secured:  Tegaderm and tape  Insertion:  Uncomplicated  Patient tolerance:  Patient tolerated the procedure well with no immediate complications  For Vitals see nursing notes.     Lorena Diaz CRNA  9:44 AM

## 2017-02-07 NOTE — L&D DELIVERY NOTE
Delivery Summary    Patient: Lokesh Townsend MRN: [id number]  SSN: [id number]    YOB: 1980  Age: 43 y.o. Sex: female        Lokesh Townsend painlessly labored the fetal head to C/C/+3. Nursery in room for delivery. Carmen  pushed well in SF position to quickly complete  of LFI RYANNE with transverse shoulders over intact perineum. Infant was vigorous and placed on maternal abdomen for tactile stim. When placental transfusion completed, cord double-clamped for cut by father. Placenta delivered [facility] spontaneously with CCT and fundal massage. It was found to be intact with centrally-inserted 3vc. [email or web address] Dr. Kamla Murry given report    Labor Events:    Labor: No    Rupture Date: 2017    Rupture Time: 1:15 PM    Rupture Type AROM    Amniotic Fluid Volume:  Moderate     Amniotic Fluid Description:         Induction:           Augmentation: Oxytocin;AROM    Labor Complications: Dysfunctional Labor     Additional Complications:        Cervical Ripening:       None      Delivery Events:  Episiotomy: None    Laceration(s): None       Repaired: None     Number of Repair Packets:      Suture Type and Size: None        Estimated Blood Loss (ml):          Information for the patient's :  Kayode Bailey [[id number]]     Delivery Summary - Baby    Delivery Date: 2017   Delivery Time: 1:19 PM   Delivery Type: Vaginal, Spontaneous Delivery  Sex:  female  Gestational Age: 37w0d  Delivery Clinician:  Domenic Garber  Living?: Yes   Delivery Location: L&D 3420           APGARS  One minute Five minutes Ten minutes   Skin Color: 1    1       Heart Rate: 2   2         Reflex Irritability: 2   2         Muscle Tone: 2   2       Respiration: 2   2         Total: 9   9           Presentation: Vertex  Position: Right Occiput Anterior  Resuscitation Method:  Tactile Stimulation;Suctioning-bulb     Meconium Stained: None    Cord Information: 3 Vessels   Complications: None  Cord Blood Sent?: Yes    Blood Gases Sent?:  No    Placenta:  Date/Time:   1:25 PM  Removal: Spontaneous      Appearance: Normal;Intact     [place] Measurements:  Birth Weight:    pending    Other Providers:   THALIA CONDE;MAIRA RAMIREZ;Bethany CLAYTON GREGORY M Midwife;Primary Nurse;Primary [place] Nurse;Crna

## 2017-02-07 NOTE — PROGRESS NOTES
OB Progress Note    S: Pt reports comfortable with no pressure. O: Patient Vitals for the past 4 hrs:   Temp Pulse BP SpO2   02/07/17 1215 - - 118/86 -   02/07/17 1206 - 70 - 98 %   02/07/17 1201 - - - 99 %   02/07/17 1200 - - - 100 %   02/07/17 1159 - - - 100 %   02/07/17 1151 - - - 100 %   02/07/17 1146 - 60 108/69 100 %   02/07/17 1145 - - - 100 %   02/07/17 1118 - - - 94 %   02/07/17 1116 - 61 91/70 100 %   02/07/17 1100 - 63 97/60 99 %   02/07/17 1055 - - - 100 %   02/07/17 1050 - - - 99 %   02/07/17 1045 - 60 102/61 100 %   02/07/17 1044 97.9 °F (36.6 °C) - - -   02/07/17 1032 - - - 100 %   02/07/17 1030 - 65 107/69 -   02/07/17 1027 - 66 106/68 100 %   02/07/17 1024 - 63 108/57 -   02/07/17 1022 - 62 - 99 %   02/07/17 1021 - - 108/68 -   02/07/17 1018 - (!) 58 103/62 -   02/07/17 1017 - - - 100 %   02/07/17 1015 - 60 104/63 -   02/07/17 1012 - 61 102/67 100 %   02/07/17 1010 - - - 100 %   02/07/17 1009 - 65 104/64 -   02/07/17 1006 - 75 110/64 -   02/07/17 1003 - 68 113/59 -   02/07/17 1000 - 69 119/68 99 %   02/07/17 0957 - 73 113/62 -   02/07/17 0954 - (!) 58 112/58 -   02/07/17 0951 - 66 111/69 -   02/07/17 0948 - 78 111/62 -   02/07/17 0945 - 72 114/68 100 %   02/07/17 0943 - 70 116/64 -   02/07/17 0940 - 68 122/59 100 %   02/07/17 0938 - 82 130/83 -            VE: 10/C/+2 and crowns with mild push.   VINNY       TOCO: q 2 minutes       FHT: category 1        Presentation:  A/P: IUP at  [id number],  Expectant   Nurses notified and Eri ESPINOM aware                      Karyn Lux MD  February 7, 2017

## 2017-02-08 VITALS
OXYGEN SATURATION: 99 % | RESPIRATION RATE: 16 BRPM | BODY MASS INDEX: 25.83 KG/M2 | TEMPERATURE: 97.9 F | HEART RATE: 79 BPM | SYSTOLIC BLOOD PRESSURE: 110 MMHG | WEIGHT: 155 LBS | HEIGHT: 65 IN | DIASTOLIC BLOOD PRESSURE: 78 MMHG

## 2017-02-08 LAB
HCT VFR BLD AUTO: 35.1 % (ref 35–45)
HGB BLD-MCNC: 11.5 G/DL (ref 12–16)

## 2017-02-08 PROCEDURE — 74011250637 HC RX REV CODE- 250/637: Performed by: ADVANCED PRACTICE MIDWIFE

## 2017-02-08 PROCEDURE — 86900 BLOOD TYPING SEROLOGIC ABO: CPT | Performed by: ADVANCED PRACTICE MIDWIFE

## 2017-02-08 PROCEDURE — 85461 HEMOGLOBIN FETAL: CPT | Performed by: ADVANCED PRACTICE MIDWIFE

## 2017-02-08 PROCEDURE — 85018 HEMOGLOBIN: CPT | Performed by: ADVANCED PRACTICE MIDWIFE

## 2017-02-08 PROCEDURE — 74011250636 HC RX REV CODE- 250/636: Performed by: ADVANCED PRACTICE MIDWIFE

## 2017-02-08 PROCEDURE — 85014 HEMATOCRIT: CPT | Performed by: ADVANCED PRACTICE MIDWIFE

## 2017-02-08 PROCEDURE — 36415 COLL VENOUS BLD VENIPUNCTURE: CPT | Performed by: ADVANCED PRACTICE MIDWIFE

## 2017-02-08 RX ORDER — IBUPROFEN 800 MG/1
800 TABLET ORAL
Qty: 60 TAB | Refills: 0 | Status: SHIPPED | OUTPATIENT
Start: 2017-02-08

## 2017-02-08 RX ADMIN — IBUPROFEN 800 MG: 400 TABLET, FILM COATED ORAL at 06:42

## 2017-02-08 RX ADMIN — HUMAN RHO(D) IMMUNE GLOBULIN 0.3 MG: 300 INJECTION, SOLUTION INTRAMUSCULAR at 11:34

## 2017-02-08 NOTE — LACTATION NOTE
Bedside and Verbal shift change report given to Ho Moody RN   (oncoming nurse) by Casandra Leach. Paty Skelton RN  (offgoing nurse). Report included the following information SBAR, MAR and Recent Results. Pt resting in bed. Mother at bedside Holding infant.

## 2017-02-08 NOTE — PROGRESS NOTES
Discharge instructions reviewed with patient, understanding verbalized of all instructions given. Time given for questions, all questions answered. Electronic signature obtained. Mom brought to front entrance in wheelchair, discharged in stable condition.

## 2017-02-08 NOTE — ROUTINE PROCESS
Received report from Neida Linares RN using Delivery Summary, SBAR, Kardex, I&O, MAR, and Lab results. Assumed care of pt and infant.

## 2017-02-08 NOTE — DISCHARGE SUMMARY
[address]   [address]  [address]  [phone]       Obstetrical Discharge Summary     Patient ID:  Maliha Watkins  [id number]  5 y.o.  1980    Admit Date: 2017    Discharge Date: 2017     Admitting Physician: Hope Mark MD     Admission Diagnoses: 0  Labor and delivery indication for care or intervention    Final Diagnosis: [email or web address] Delivered    Additional Diagnoses:Present on Admission:   (Resolved) Labor and delivery indication for care or intervention         OB History      Para Term  AB TAB SAB Ectopic Multiple Living    2 2 2      0 2        Lab Results   Component Value Date/Time    Rubella, External immune 2016    GrBStrep, External negative 2017       Baby link  Information for the patient's :  Alonso Muñoz [[id number]]     Delivery Type: Vaginal, Spontaneous Delivery   Delivery Date: 2017   Delivery Time: 1:19 PM     Birth Weight: 3.62 kg     Sex:  female  Delivery Clinician:  Mundo Dong   Gestational Age: Gestational Age: 37w0d    Presentation: Vertex   Position: Right  Occiput  Anterior     Apgars were 9  and 9      Resuscitation Method: Tactile Stimulation;Suctioning-bulb     Meconium Stained: None  Living Status: Yes       Placenta Date/Time:   1:25 PM   Placenta Removal: Spontaneous   Placenta Appearance: Vertex [1]     Cord Information: 3 Vessels    Cord Events: None       Cord Blood Sent?:  Yes    Blood Gases Sent?:  No      Infant Information:   Information for the patient's :  Alonso Galiciaen [[id number]]   One Minute Apgar: 9 (Filed from Delivery Summary)  Five  Minute Apgar: 9 (Filed from Delivery Summary)     Baby Procedures:    Information for the patient's :  Alonso Galiciaen [[id number]]        Diet:  Regular  Feeding Method: Infant Feeding: Breastmilk    Vitals:  No data found.     Temp (24hrs), Av °F (36.7 °C), Min:97.8 °F (36.6 °C), Max:98.1 °F (36.7 °C)      Vital signs stable, afebrile. Exam:  Patient is in good general condition. Emotionally: appears to be stable  Fundus:  Firm and not tender. Lower extremities are negative for swelling, cords or tenderness. Lab/Data Review:  CBC:   Lab Results   Component Value Date/Time    WBC 14.4 02/07/2017 08:00 AM    RBC 4.16 02/07/2017 08:00 AM    HGB 11.5 02/08/2017 05:30 AM    HCT 35.1 02/08/2017 05:30 AM    PLATELET 156 92/74/0642 08:00 AM    Hgb, External 12.0 07/08/2016    Hct, External 36.6 07/08/2016     Lab results reviewed. For significant abnormal values and values requiring intervention, see assessment and plan. Activity: as tolerated    Discharge Instructions: Nothing in vagina, no heavy lifting or exercise for 6 weeks. No driving for 1 week or while taking narcotics. SITZ bath for perineal discomfort. F/U 6 weeks post partum check. Call for heavy bleeding, temperature over 101 degrees, heavy vaginal bleeding, foul vaginal odor or worsening abdominal pain. Medications:   Current Discharge Medication List      CONTINUE these medications which have CHANGED    Details   ibuprofen (MOTRIN) 800 mg tablet Take 1 Tab by mouth every eight (8) hours as needed for Pain. Qty: 60 Tab, Refills: 0         CONTINUE these medications which have NOT CHANGED    Details   ALFALFA, BULK, by Does Not Apply route. PNV with Ca No.65-Iron Poly-FA 60 mg iron-1 mg cap Take 1 Tab by mouth daily. Indications: PREGNANCY      iron polysaccharides (NIFEREX) 150 mg iron capsule Take 1 Cap by mouth daily. Qty: 30 Cap, Refills: 2               Condition at discharge: Stable and doing well.   Disposition: Home    February 8, 2017  Diana Winston CNM

## 2017-02-08 NOTE — LACTATION NOTE
Mom attempted to nurse her first child but, had problems with supply. Mom's breast are hypoplastic, wide-spaced. Mom had more breast changes with this pregnancy. Has seen an IBCLC and was able to express colostrum during pregnancy. Discussed supply, ways to help increase supply. Baby latched well after delivery and nursed well. Mom has heard swallowing with feedings. Helped wake baby and position in cradle and football holds. Baby latched and nursed briefly. Mom's nipples are sore, do not look damaged. Sore nipple management discussed. Reviewed latch, nursing pattern and expectations, wet/dirty diapers, milk coming in. Offered help with feedings, encouraged skin-to-skin. Info sheet and daily log given.

## 2017-02-08 NOTE — DISCHARGE INSTRUCTIONS

## 2017-02-09 LAB
ABO + RH BLD: NORMAL
ABO + RH BLDCO: NORMAL
BLD PROD TYP BPU: NORMAL
BPU ID: NORMAL
CALLED TO:,BCALL1: NORMAL
FETAL SCREEN,FMHS: NORMAL
STATUS OF UNIT,%ST: NORMAL
UNIT DIVISION, %UDIV: 0

## 2017-02-15 NOTE — ANESTHESIA POSTPROCEDURE EVALUATION
Post-Anesthesia Evaluation and Assessment    Patient: Hernando Torres MRN: [id number]  SSN: [id number]    YOB: 1980  Age: 43 y.o. Sex: female      Data from PACU flowsheet    Cardiovascular Function/Vital Signs  There were no vitals taken for this visit. Patient is status post epidural anesthesia for * No procedures listed *. Nausea/Vomiting: controlled    Postoperative hydration reviewed and adequate. Pain:      Managed      Mental Status and Level of Consciousness: Alert and oriented     Pulmonary Status:       Adequate oxygenation and airway patent    Complications related to anesthesia: None    Post-anesthesia assessment completed.  No concerns    Signed By: Nandini Castro MD     February 15, 2017

## 2017-11-01 ENCOUNTER — HOSPITAL ENCOUNTER (OUTPATIENT)
Dept: LAB | Age: 37
Discharge: HOME | End: 2017-11-01
Payer: OTHER GOVERNMENT

## 2017-11-01 PROCEDURE — 88175 CYTOPATH C/V AUTO FLUID REDO: CPT | Performed by: OBSTETRICS & GYNECOLOGY

## 2017-11-01 PROCEDURE — 87624 HPV HI-RISK TYP POOLED RSLT: CPT | Performed by: OBSTETRICS & GYNECOLOGY
